# Patient Record
Sex: MALE | Race: WHITE | NOT HISPANIC OR LATINO | Employment: FULL TIME | URBAN - NONMETROPOLITAN AREA
[De-identification: names, ages, dates, MRNs, and addresses within clinical notes are randomized per-mention and may not be internally consistent; named-entity substitution may affect disease eponyms.]

---

## 2018-02-16 ENCOUNTER — OFFICE VISIT CONVERTED (OUTPATIENT)
Dept: FAMILY MEDICINE CLINIC | Age: 58
End: 2018-02-16
Attending: NURSE PRACTITIONER

## 2018-05-14 ENCOUNTER — OFFICE VISIT CONVERTED (OUTPATIENT)
Dept: FAMILY MEDICINE CLINIC | Age: 58
End: 2018-05-14
Attending: NURSE PRACTITIONER

## 2018-10-05 ENCOUNTER — OFFICE VISIT CONVERTED (OUTPATIENT)
Dept: FAMILY MEDICINE CLINIC | Age: 58
End: 2018-10-05
Attending: NURSE PRACTITIONER

## 2019-03-30 ENCOUNTER — HOSPITAL ENCOUNTER (OUTPATIENT)
Dept: OTHER | Facility: HOSPITAL | Age: 59
Discharge: HOME | End: 2019-03-30
Attending: NURSE PRACTITIONER

## 2019-03-30 LAB
ALBUMIN SERPL-MCNC: 4.2 G/DL (ref 3.5–5)
ALBUMIN/GLOB SERPL: 1.2 {RATIO} (ref 1.4–2.6)
ALP SERPL-CCNC: 92 U/L (ref 56–119)
ALT SERPL-CCNC: 23 U/L (ref 10–40)
ANION GAP SERPL CALC-SCNC: 15 MMOL/L (ref 8–19)
AST SERPL-CCNC: 19 U/L (ref 15–50)
BILIRUB SERPL-MCNC: 0.18 MG/DL (ref 0.2–1.3)
BUN SERPL-MCNC: 16 MG/DL (ref 5–25)
BUN/CREAT SERPL: 20 {RATIO} (ref 6–20)
CALCIUM SERPL-MCNC: 9.2 MG/DL (ref 8.7–10.4)
CHLORIDE SERPL-SCNC: 99 MMOL/L (ref 99–111)
CHOLEST SERPL-MCNC: 191 MG/DL (ref 107–200)
CHOLEST/HDLC SERPL: 6.6 {RATIO} (ref 3–6)
CONV CO2: 23 MMOL/L (ref 22–32)
CONV TOTAL PROTEIN: 7.6 G/DL (ref 6.3–8.2)
CREAT UR-MCNC: 0.82 MG/DL (ref 0.7–1.2)
GFR SERPLBLD BASED ON 1.73 SQ M-ARVRAT: >60 ML/MIN/{1.73_M2}
GLOBULIN UR ELPH-MCNC: 3.4 G/DL (ref 2–3.5)
GLUCOSE SERPL-MCNC: 94 MG/DL (ref 70–99)
HDLC SERPL-MCNC: 29 MG/DL (ref 40–60)
LDLC SERPL CALC-MCNC: 126 MG/DL (ref 70–100)
OSMOLALITY SERPL CALC.SUM OF ELEC: 277 MOSM/KG (ref 273–304)
POTASSIUM SERPL-SCNC: 4.4 MMOL/L (ref 3.5–5.3)
SODIUM SERPL-SCNC: 133 MMOL/L (ref 135–147)
TRIGL SERPL-MCNC: 182 MG/DL (ref 40–150)
VLDLC SERPL-MCNC: 36 MG/DL (ref 5–37)

## 2019-11-05 ENCOUNTER — OFFICE VISIT CONVERTED (OUTPATIENT)
Dept: FAMILY MEDICINE CLINIC | Age: 59
End: 2019-11-05
Attending: NURSE PRACTITIONER

## 2019-11-29 ENCOUNTER — HOSPITAL ENCOUNTER (OUTPATIENT)
Dept: OTHER | Facility: HOSPITAL | Age: 59
Discharge: HOME | End: 2019-11-29
Attending: NURSE PRACTITIONER

## 2019-11-29 LAB
ALBUMIN SERPL-MCNC: 4.1 G/DL (ref 3.5–5)
ALBUMIN/GLOB SERPL: 1.2 {RATIO} (ref 1.4–2.6)
ALP SERPL-CCNC: 95 U/L (ref 56–119)
ALT SERPL-CCNC: 18 U/L (ref 10–40)
ANION GAP SERPL CALC-SCNC: 18 MMOL/L (ref 8–19)
AST SERPL-CCNC: 17 U/L (ref 15–50)
BILIRUB SERPL-MCNC: 0.29 MG/DL (ref 0.2–1.3)
BUN SERPL-MCNC: 11 MG/DL (ref 5–25)
BUN/CREAT SERPL: 14 {RATIO} (ref 6–20)
CALCIUM SERPL-MCNC: 9.2 MG/DL (ref 8.7–10.4)
CHLORIDE SERPL-SCNC: 98 MMOL/L (ref 99–111)
CHOLEST SERPL-MCNC: 199 MG/DL (ref 107–200)
CHOLEST/HDLC SERPL: 6.9 {RATIO} (ref 3–6)
CONV CO2: 23 MMOL/L (ref 22–32)
CONV TOTAL PROTEIN: 7.5 G/DL (ref 6.3–8.2)
CREAT UR-MCNC: 0.8 MG/DL (ref 0.7–1.2)
GFR SERPLBLD BASED ON 1.73 SQ M-ARVRAT: >60 ML/MIN/{1.73_M2}
GLOBULIN UR ELPH-MCNC: 3.4 G/DL (ref 2–3.5)
GLUCOSE SERPL-MCNC: 91 MG/DL (ref 70–99)
HDLC SERPL-MCNC: 29 MG/DL (ref 40–60)
LDLC SERPL CALC-MCNC: 123 MG/DL (ref 70–100)
OSMOLALITY SERPL CALC.SUM OF ELEC: 279 MOSM/KG (ref 273–304)
POTASSIUM SERPL-SCNC: 4.4 MMOL/L (ref 3.5–5.3)
SODIUM SERPL-SCNC: 135 MMOL/L (ref 135–147)
TRIGL SERPL-MCNC: 233 MG/DL (ref 40–150)
VLDLC SERPL-MCNC: 47 MG/DL (ref 5–37)

## 2020-01-01 ENCOUNTER — HOSPITAL ENCOUNTER (OUTPATIENT)
Dept: OTHER | Facility: HOSPITAL | Age: 60
Discharge: HOME | End: 2020-09-04
Attending: NURSE PRACTITIONER

## 2020-01-01 ENCOUNTER — OFFICE VISIT CONVERTED (OUTPATIENT)
Dept: FAMILY MEDICINE CLINIC | Age: 60
End: 2020-01-01
Attending: NURSE PRACTITIONER

## 2020-01-01 LAB
ALBUMIN SERPL-MCNC: 4.1 G/DL (ref 3.5–5)
ALBUMIN/GLOB SERPL: 1.3 {RATIO} (ref 1.4–2.6)
ALP SERPL-CCNC: 78 U/L (ref 56–119)
ALT SERPL-CCNC: 16 U/L (ref 10–40)
ANION GAP SERPL CALC-SCNC: 15 MMOL/L (ref 8–19)
AST SERPL-CCNC: 17 U/L (ref 15–50)
BILIRUB SERPL-MCNC: 0.22 MG/DL (ref 0.2–1.3)
BUN SERPL-MCNC: 11 MG/DL (ref 5–25)
BUN/CREAT SERPL: 14 {RATIO} (ref 6–20)
CALCIUM SERPL-MCNC: 9.3 MG/DL (ref 8.7–10.4)
CHLORIDE SERPL-SCNC: 102 MMOL/L (ref 99–111)
CHOLEST SERPL-MCNC: 213 MG/DL (ref 107–200)
CHOLEST/HDLC SERPL: 7.3 {RATIO} (ref 3–6)
CONV CO2: 22 MMOL/L (ref 22–32)
CONV TOTAL PROTEIN: 7.2 G/DL (ref 6.3–8.2)
CREAT UR-MCNC: 0.78 MG/DL (ref 0.7–1.2)
GFR SERPLBLD BASED ON 1.73 SQ M-ARVRAT: >60 ML/MIN/{1.73_M2}
GLOBULIN UR ELPH-MCNC: 3.1 G/DL (ref 2–3.5)
GLUCOSE SERPL-MCNC: 90 MG/DL (ref 70–99)
HDLC SERPL-MCNC: 29 MG/DL (ref 40–60)
LDLC SERPL CALC-MCNC: 143 MG/DL (ref 70–100)
OSMOLALITY SERPL CALC.SUM OF ELEC: 279 MOSM/KG (ref 273–304)
POTASSIUM SERPL-SCNC: 4 MMOL/L (ref 3.5–5.3)
SODIUM SERPL-SCNC: 135 MMOL/L (ref 135–147)
TRIGL SERPL-MCNC: 204 MG/DL (ref 40–150)
VLDLC SERPL-MCNC: 41 MG/DL (ref 5–37)

## 2020-05-20 ENCOUNTER — OFFICE VISIT CONVERTED (OUTPATIENT)
Dept: FAMILY MEDICINE CLINIC | Age: 60
End: 2020-05-20
Attending: NURSE PRACTITIONER

## 2021-01-01 ENCOUNTER — TELEPHONE (OUTPATIENT)
Dept: ORTHOPEDIC SURGERY | Facility: CLINIC | Age: 61
End: 2021-01-01

## 2021-01-01 ENCOUNTER — OFFICE VISIT CONVERTED (OUTPATIENT)
Dept: FAMILY MEDICINE CLINIC | Age: 61
End: 2021-01-01
Attending: NURSE PRACTITIONER

## 2021-01-01 ENCOUNTER — OFFICE VISIT (OUTPATIENT)
Dept: ORTHOPEDIC SURGERY | Facility: CLINIC | Age: 61
End: 2021-01-01

## 2021-01-01 ENCOUNTER — OFFICE VISIT CONVERTED (OUTPATIENT)
Dept: FAMILY MEDICINE CLINIC | Age: 61
End: 2021-01-01
Attending: FAMILY MEDICINE

## 2021-01-01 ENCOUNTER — HOSPITAL ENCOUNTER (OUTPATIENT)
Dept: OTHER | Facility: HOSPITAL | Age: 61
Discharge: HOME | End: 2021-04-08
Attending: FAMILY MEDICINE

## 2021-01-01 ENCOUNTER — HOSPITAL ENCOUNTER (OUTPATIENT)
Dept: OTHER | Facility: HOSPITAL | Age: 61
Discharge: HOME | End: 2021-02-04
Attending: FAMILY MEDICINE

## 2021-01-01 VITALS — HEIGHT: 75 IN | TEMPERATURE: 97.2 F | WEIGHT: 229 LBS | BODY MASS INDEX: 28.47 KG/M2

## 2021-01-01 VITALS
BODY MASS INDEX: 30.54 KG/M2 | TEMPERATURE: 97.6 F | HEIGHT: 73 IN | WEIGHT: 230.4 LBS | SYSTOLIC BLOOD PRESSURE: 133 MMHG | HEART RATE: 86 BPM | DIASTOLIC BLOOD PRESSURE: 78 MMHG

## 2021-01-01 VITALS
SYSTOLIC BLOOD PRESSURE: 126 MMHG | HEART RATE: 123 BPM | TEMPERATURE: 98 F | BODY MASS INDEX: 32.2 KG/M2 | WEIGHT: 243 LBS | DIASTOLIC BLOOD PRESSURE: 89 MMHG | SYSTOLIC BLOOD PRESSURE: 139 MMHG | HEART RATE: 122 BPM | HEIGHT: 73 IN | HEIGHT: 73 IN | DIASTOLIC BLOOD PRESSURE: 93 MMHG | BODY MASS INDEX: 32.06 KG/M2

## 2021-01-01 VITALS
WEIGHT: 232.6 LBS | HEART RATE: 82 BPM | DIASTOLIC BLOOD PRESSURE: 90 MMHG | TEMPERATURE: 96.9 F | BODY MASS INDEX: 30.83 KG/M2 | SYSTOLIC BLOOD PRESSURE: 136 MMHG | HEIGHT: 73 IN

## 2021-01-01 VITALS
WEIGHT: 244.5 LBS | DIASTOLIC BLOOD PRESSURE: 93 MMHG | OXYGEN SATURATION: 95 % | TEMPERATURE: 97 F | BODY MASS INDEX: 32.4 KG/M2 | HEIGHT: 73 IN | HEART RATE: 108 BPM | SYSTOLIC BLOOD PRESSURE: 133 MMHG

## 2021-01-01 VITALS
DIASTOLIC BLOOD PRESSURE: 80 MMHG | TEMPERATURE: 98.1 F | HEIGHT: 73 IN | SYSTOLIC BLOOD PRESSURE: 127 MMHG | WEIGHT: 232.4 LBS | HEART RATE: 89 BPM | BODY MASS INDEX: 30.8 KG/M2

## 2021-01-01 VITALS
HEIGHT: 73 IN | TEMPERATURE: 97.1 F | HEART RATE: 88 BPM | BODY MASS INDEX: 30.77 KG/M2 | SYSTOLIC BLOOD PRESSURE: 138 MMHG | DIASTOLIC BLOOD PRESSURE: 85 MMHG | WEIGHT: 232.2 LBS

## 2021-01-01 VITALS
TEMPERATURE: 97.1 F | WEIGHT: 235 LBS | DIASTOLIC BLOOD PRESSURE: 83 MMHG | HEIGHT: 76 IN | BODY MASS INDEX: 28.62 KG/M2 | SYSTOLIC BLOOD PRESSURE: 127 MMHG | HEART RATE: 87 BPM

## 2021-01-01 VITALS
TEMPERATURE: 98 F | BODY MASS INDEX: 30.85 KG/M2 | DIASTOLIC BLOOD PRESSURE: 77 MMHG | HEIGHT: 73 IN | SYSTOLIC BLOOD PRESSURE: 135 MMHG | HEART RATE: 97 BPM | WEIGHT: 232.8 LBS

## 2021-01-01 VITALS
WEIGHT: 249.8 LBS | TEMPERATURE: 97.3 F | HEIGHT: 73 IN | HEART RATE: 108 BPM | SYSTOLIC BLOOD PRESSURE: 126 MMHG | DIASTOLIC BLOOD PRESSURE: 85 MMHG | BODY MASS INDEX: 33.11 KG/M2

## 2021-01-01 DIAGNOSIS — S42.295A OTHER CLOSED NONDISPLACED FRACTURE OF PROXIMAL END OF LEFT HUMERUS, INITIAL ENCOUNTER: Primary | ICD-10-CM

## 2021-01-01 DIAGNOSIS — S42.295D OTHER CLOSED NONDISPLACED FRACTURE OF PROXIMAL END OF LEFT HUMERUS WITH ROUTINE HEALING, SUBSEQUENT ENCOUNTER: Primary | ICD-10-CM

## 2021-01-01 LAB
ALBUMIN SERPL-MCNC: 4.3 G/DL (ref 3.5–5)
ALBUMIN SERPL-MCNC: 4.4 G/DL (ref 3.5–5)
ALBUMIN/GLOB SERPL: 1.1 {RATIO} (ref 1.4–2.6)
ALBUMIN/GLOB SERPL: 1.2 {RATIO} (ref 1.4–2.6)
ALP SERPL-CCNC: 232 U/L (ref 56–119)
ALP SERPL-CCNC: 245 U/L (ref 56–119)
ALT SERPL-CCNC: 19 U/L (ref 10–40)
ALT SERPL-CCNC: 22 U/L (ref 10–40)
ANION GAP SERPL CALC-SCNC: 19 MMOL/L (ref 8–19)
ANION GAP SERPL CALC-SCNC: 20 MMOL/L (ref 8–19)
APPEARANCE UR: CLEAR
AST SERPL-CCNC: 24 U/L (ref 15–50)
AST SERPL-CCNC: 28 U/L (ref 15–50)
BACTERIA UR QL AUTO: ABNORMAL
BASOPHILS # BLD AUTO: 0.06 10*3/UL (ref 0–0.2)
BASOPHILS # BLD MANUAL: 0.03 10*3/UL (ref 0–0.2)
BASOPHILS NFR BLD AUTO: 0.3 % (ref 0–3)
BASOPHILS NFR BLD MANUAL: 0.2 % (ref 0–3)
BILIRUB SERPL-MCNC: 0.36 MG/DL (ref 0.2–1.3)
BILIRUB SERPL-MCNC: 0.4 MG/DL (ref 0.2–1.3)
BILIRUB UR QL: NEGATIVE
BUN SERPL-MCNC: 10 MG/DL (ref 5–25)
BUN SERPL-MCNC: 10 MG/DL (ref 5–25)
BUN/CREAT SERPL: 14 {RATIO} (ref 6–20)
BUN/CREAT SERPL: 15 {RATIO} (ref 6–20)
CALCIUM SERPL-MCNC: 9.5 MG/DL (ref 8.7–10.4)
CALCIUM SERPL-MCNC: 9.5 MG/DL (ref 8.7–10.4)
CASTS URNS QL MICRO: ABNORMAL /[LPF]
CHLORIDE SERPL-SCNC: 94 MMOL/L (ref 99–111)
CHLORIDE SERPL-SCNC: 96 MMOL/L (ref 99–111)
CHOLEST SERPL-MCNC: 179 MG/DL (ref 107–200)
CHOLEST/HDLC SERPL: 5.6 {RATIO} (ref 3–6)
CK SERPL-CCNC: 124 U/L (ref 57–374)
COLOR UR: YELLOW
CONV ABS IMM GRAN: 0.12 10*3/UL (ref 0–0.2)
CONV CO2: 20 MMOL/L (ref 22–32)
CONV CO2: 21 MMOL/L (ref 22–32)
CONV IMMATURE GRAN: 0.6 % (ref 0–1.8)
CONV LEUKOCYTE ESTERASE: NEGATIVE
CONV TOTAL PROTEIN: 8.1 G/DL (ref 6.3–8.2)
CONV TOTAL PROTEIN: 8.1 G/DL (ref 6.3–8.2)
CONV UROBILINOGEN IN URINE BY AUTOMATED TEST STRIP: 0.2 {EHRLICHU}/DL (ref 0.1–1)
CREAT UR-MCNC: 0.67 MG/DL (ref 0.7–1.2)
CREAT UR-MCNC: 0.72 MG/DL (ref 0.7–1.2)
DEPRECATED RDW RBC AUTO: 42.9 FL (ref 35.1–43.9)
DEPRECATED RDW RBC AUTO: 44.7 FL
EOSINOPHIL # BLD AUTO: 0.04 10*3/UL (ref 0–0.7)
EOSINOPHIL # BLD AUTO: 0.2 % (ref 0–7)
EOSINOPHIL # BLD MANUAL: 0.04 10*3/UL (ref 0–0.7)
EOSINOPHIL NFR BLD MANUAL: 0.3 % (ref 0–7)
EPI CELLS #/AREA URNS HPF: ABNORMAL /[HPF]
ERYTHROCYTE [DISTWIDTH] IN BLOOD BY AUTOMATED COUNT: 13 % (ref 11.6–14.4)
ERYTHROCYTE [DISTWIDTH] IN BLOOD BY AUTOMATED COUNT: 13.1 % (ref 11.5–14.5)
ERYTHROCYTE [SEDIMENTATION RATE] IN BLOOD: 55 MM/H (ref 0–20)
ERYTHROCYTE [SEDIMENTATION RATE] IN BLOOD: 65 MM/H (ref 0–20)
GFR SERPLBLD BASED ON 1.73 SQ M-ARVRAT: >60 ML/MIN/{1.73_M2}
GFR SERPLBLD BASED ON 1.73 SQ M-ARVRAT: >60 ML/MIN/{1.73_M2}
GLOBULIN UR ELPH-MCNC: 3.7 G/DL (ref 2–3.5)
GLOBULIN UR ELPH-MCNC: 3.8 G/DL (ref 2–3.5)
GLUCOSE 24H UR-MCNC: NEGATIVE MG/DL
GLUCOSE SERPL-MCNC: 102 MG/DL (ref 70–99)
GLUCOSE SERPL-MCNC: 97 MG/DL (ref 70–99)
GRANS (ABSOLUTE): 10.19 10*3/UL (ref 2–8)
GRANS: 70.3 % (ref 30–85)
HBA1C MFR BLD: 14.2 G/DL (ref 14–18)
HCT VFR BLD AUTO: 42.2 % (ref 42–52)
HCT VFR BLD AUTO: 42.8 % (ref 42–52)
HDLC SERPL-MCNC: 32 MG/DL (ref 40–60)
HGB BLD-MCNC: 14.1 G/DL (ref 14–18)
HGB UR QL STRIP: NEGATIVE
IMM GRANULOCYTES # BLD: 0.05 10*3/UL (ref 0–0.54)
IMM GRANULOCYTES NFR BLD: 0.3 % (ref 0–0.43)
KETONES UR QL STRIP: 15 MG/DL
LDLC SERPL CALC-MCNC: 114 MG/DL (ref 70–100)
LYMPHOCYTES # BLD AUTO: 2.96 10*3/UL (ref 1–5)
LYMPHOCYTES # BLD MANUAL: 2.91 10*3/UL (ref 1–5)
LYMPHOCYTES NFR BLD AUTO: 15.3 % (ref 20–45)
LYMPHOCYTES NFR BLD MANUAL: 8.8 % (ref 3–10)
MCH RBC QN AUTO: 30 PG (ref 27–31)
MCH RBC QN AUTO: 30.5 PG (ref 27–31)
MCHC RBC AUTO-ENTMCNC: 33.2 G/DL (ref 33–37)
MCHC RBC AUTO-ENTMCNC: 33.4 G/DL (ref 33–37)
MCV RBC AUTO: 89.8 FL (ref 80–96)
MCV RBC AUTO: 91.8 FL (ref 80–96)
MONOCYTES # BLD AUTO: 1.27 10*3/UL (ref 0.2–1.2)
MONOCYTES # BLD AUTO: 1.57 10*3/UL (ref 0.2–1.2)
MONOCYTES NFR BLD AUTO: 8.1 % (ref 3–10)
MUCOUS THREADS URNS QL MICRO: ABNORMAL
NEUTROPHILS # BLD AUTO: 14.57 10*3/UL (ref 2–8)
NEUTROPHILS NFR BLD AUTO: 75.5 % (ref 30–85)
NITRITE UR-MCNC: NEGATIVE MG/ML
NRBC CBCN: 0 % (ref 0–0.7)
OSMOLALITY SERPL CALC.SUM OF ELEC: 267 MOSM/KG (ref 273–304)
OSMOLALITY SERPL CALC.SUM OF ELEC: 273 MOSM/KG (ref 273–304)
PH UR STRIP.AUTO: 7 [PH] (ref 5–8)
PLATELET # BLD AUTO: 454 10*3/UL (ref 130–400)
PLATELET # BLD AUTO: 457 10*3/UL (ref 130–400)
PMV BLD AUTO: 8.5 FL (ref 7.4–10.4)
PMV BLD AUTO: 8.7 FL (ref 9.4–12.4)
POTASSIUM SERPL-SCNC: 4.4 MMOL/L (ref 3.5–5.3)
POTASSIUM SERPL-SCNC: 4.5 MMOL/L (ref 3.5–5.3)
PROT UR-MCNC: NEGATIVE MG/DL
PSA SERPL-MCNC: 1.83 NG/ML (ref 0–4)
RBC # BLD AUTO: 4.66 10*6/UL (ref 4.7–6.1)
RBC # BLD AUTO: 4.7 10*6/UL (ref 4.7–6.1)
RBC # BLD AUTO: ABNORMAL /[HPF]
SARS-COV-2 RNA SPEC QL NAA+PROBE: NOT DETECTED
SODIUM SERPL-SCNC: 129 MMOL/L (ref 135–147)
SODIUM SERPL-SCNC: 132 MMOL/L (ref 135–147)
SP GR UR STRIP: 1.02 (ref 1–1.03)
SPECIMEN SOURCE: ABNORMAL
TRIGL SERPL-MCNC: 167 MG/DL (ref 40–150)
TSH SERPL-ACNC: 1.45 M[IU]/L (ref 0.27–4.2)
UNIDENT CRYS URNS QL MICRO: ABNORMAL /[HPF]
VARIANT LYMPHS NFR BLD MANUAL: 20.1 % (ref 20–45)
VLDLC SERPL-MCNC: 33 MG/DL (ref 5–37)
WBC # BLD AUTO: 14.49 10*3/UL (ref 4.8–10.8)
WBC # BLD AUTO: 19.32 10*3/UL (ref 4.8–10.8)
WBC #/AREA URNS HPF: ABNORMAL /[HPF]

## 2021-01-01 PROCEDURE — 99204 OFFICE O/P NEW MOD 45 MIN: CPT | Performed by: PHYSICIAN ASSISTANT

## 2021-01-01 PROCEDURE — 23600 CLTX PROX HUMRL FX W/O MNPJ: CPT | Performed by: PHYSICIAN ASSISTANT

## 2021-01-01 RX ORDER — ASPIRIN 81 MG/1
81 TABLET ORAL DAILY
COMMUNITY

## 2021-01-01 RX ORDER — CETIRIZINE HYDROCHLORIDE 10 MG/1
10 TABLET ORAL
COMMUNITY
Start: 2021-01-01

## 2021-05-18 NOTE — PROGRESS NOTES
Satnam Leone 1960     Office/Outpatient Visit    Visit Date: Tue, Nov 5, 2019 02:08 pm    Provider: Tiff Milner N.P. (Assistant: Sarah Ruiz MA)    Location: [facility]        Electronically signed by Tiff Milner N.P. on  11/08/2019 08:25:19 PM                             SUBJECTIVE:        CC:     Mr. Leone is a 59 year old White male.  He presents with medication refills.          HPI:         PHQ-9 Depression Screening: Completed form scanned and in chart; Total Score 0         With regard to the hyperlipidemia, patient presents with hyperlipidemia.  Current treatment includes Pravachol and a low cholesterol/low fat diet.  Compliance with treatment has been good.  He denies experiencing any hypercholesterolemia related symptoms.          Additionally, he presents with history of hypertension.  hypertension details; his current cardiac medication regimen includes an ACE inhibitor ( lisinopril ).  He did not bring his blood pressure diary, but says that pressures have been well controlled.  He is tolerating the medication well without side effects.  Compliance with treatment has been good; he takes his medication as directed.      ROS:     CONSTITUTIONAL:  Negative for chills, fatigue, fever, and weight change.      CARDIOVASCULAR:  Negative for chest pain, palpitations, tachycardia, orthopnea, and edema.      RESPIRATORY:  Negative for cough, dyspnea, and hemoptysis.      GASTROINTESTINAL:  Negative for abdominal pain, heartburn, constipation, diarrhea, and stool changes.      INTEGUMENTARY:  Positive for lipomas upper back.      NEUROLOGICAL:  Negative for dizziness, headaches, paresthesias, and weakness.      ENDOCRINE:  Negative for hair loss, heat/cold intolerance, polydipsia, and polyphagia.      PSYCHIATRIC:  Negative for anxiety, depression, and sleep disturbances.          PMH/FMH/SH:     Last Reviewed on 11/05/2019 02:24 PM by Tiff Milner    Past Medical  History:             PREVENTIVE HEALTH MAINTENANCE             COLORECTAL CANCER SCREENING: declines colorectal cancer screening, understands reason for testing     PSA: declines.  understands reason for test         Surgical History:         Positive for    Splenectomy and    spinal fusion mid 1980s;;         Family History:         Positive for Hypertension ( father ).  Father:  at age 58;  Hypertension     Mother: Healthy         Social History:     Occupation: [employer]     Marital Status:      Children: None         Tobacco/Alcohol/Supplements:     Last Reviewed on 2019 02:10 PM by Sarah Ruiz    Tobacco: Current Smoker: He currently smokes every day, 1/2 pack per day.              Current Problems:     Last Reviewed on 2018 12:47 PM by Caitlin Lee    Hyperlipidemia     Hypertension     Atypical skin lesion     History of splenectomy         Immunizations:     Pneumococcal, 23-valent IM/SC (adult and pt >=2yr) 2006         Allergies:     Last Reviewed on 10/05/2018 11:20 AM by Spurling, Sarah C      No Known Drug Allergies.         Current Medications:     Last Reviewed on 10/05/2018 11:21 AM by Spurling, Sarah C    Lisinopril 20mg Tablet 1 tab daily     Pravastatin 20mg Tablet 1 tab q day hs         OBJECTIVE:        Vitals:         Historical:     10/05/2018  BP:   127/80 mm Hg ( (left arm, , sitting, );)     10/05/2018  Wt:   232.4lbs        Current: 2019 2:13:45 PM    Ht:  6 ft, 1 in;  Wt: 232.8 lbs;  BMI: 30.7    T: 98 F (oral);  BP: 135/77 mm Hg (left arm, sitting);  P: 97 bpm (left arm (BP Cuff), sitting);  sCr: 0.82 mg/dL;  GFR: 109.38        Exams:     PHYSICAL EXAM:     GENERAL:  well developed and nourished; appropriately groomed; in no apparent distress;     NECK: thyroid is non-palpable;     RESPIRATORY: normal respiratory rate and pattern with no distress; normal breath sounds with no rales, rhonchi, wheezes or rubs;     CARDIOVASCULAR:  normal rate; rhythm is regular;     SKIN: lipoma ( 1 cm at 2 locations upper back and 3 cm upper back );     MUSCULOSKELETAL:  Normal range of motion, strength and tone;     NEUROLOGIC: mental status: alert and oriented x 3; GROSSLY INTACT     PSYCHIATRIC:  appropriate affect and demeanor; normal speech pattern; grossly normal memory;         ASSESSMENT           V79.0   Z13.31  Screening for depression              DDx:     272.4   I10  Hyperlipidemia              DDx:     401.1   I10  Hypertension              DDx:     214.8   D17.79  Lipoma, other specified site              DDx:         ORDERS:         Meds Prescribed:       Refill of: Pravastatin 20mg Tablet 1 tab q day hs  #90 (Ninety) tablet(s) Refills: 1       Refill of: Lisinopril 20mg Tablet 1 tab daily  #90 (Ninety) tablet(s) Refills: 1         Lab Orders:       [id number]  HTN - [facility] CMP AND LIPID: [id number]  (Send-Out)           Other Orders:         Depression screen negative  (In-House)                   PLAN:          Screening for depression     MIPS PHQ-9 Depression Screening: Completed form scanned and in chart; Total Score 0; Negative Depression Screen   Smoking cessation encouraged. Counseling for less than 3 minutes.            Orders:         Depression screen negative  (In-House)            Hyperlipidemia declines PSA, colonoscopy, tetanus vaccine or flu vaccine         RECOMMENDATIONS given include: smoking cessation and low cholesterol/low fat diet.            Prescriptions:       Refill of: Pravastatin 20mg Tablet 1 tab q day hs  #90 (Ninety) tablet(s) Refills: 1          Hypertension     LABORATORY:  Labs ordered to be performed today include HTN/Lipid Panel: CMP, Lipid.      RECOMMENDATIONS given include: decrease consumption of alcohol, perform routine monitoring of blood pressure with home blood pressure cuff, reduction of dietary salt intake, take medication as prescribed, try not to miss doses, and smoking cessation.             Prescriptions:       Refill of: Lisinopril 20mg Tablet 1 tab daily  #90 (Ninety) tablet(s) Refills: 1           Orders:       [id number]  HTN - [facility] CMP AND LIPID: [id number], [id number]  (Send-Out)            Lipoma, other specified site         RECOMMENDATIONS given include: advise referral to surgeon .  declines..              Patient Recommendations:        For  Hyperlipidemia:     Stop smoking! Reduce the amount of cholesterol and saturated fat in your diet.          For  Hypertension:     Avoid alcohol as it can contribute to elevated blood pressure. Begin monitoring your blood pressure by brief nurse visits at our office, a home blood pressure monitor, or by checking on the machines in pharmacies or stores.  Keep a log of the readings. Reduce the amount of salt in your diet. Stop smoking!              CHARGE CAPTURE           **Please note: ICD descriptions below are intended for billing purposes only and may not represent clinical diagnoses**        Primary Diagnosis:         V79.0 Screening for depression            Z13.31    Encounter for screening for depression              Orders:          [id number]   Office/outpatient visit; established patient, level 3  (In-House)                Depression screen negative  (In-House)           272.4 Hyperlipidemia            I10    Essential (primary) hypertension    401.1 Hypertension            I10    Essential (primary) hypertension    214.8 Lipoma, other specified site            D17.79    Benign lipomatous neoplasm of other sites

## 2021-05-18 NOTE — PROGRESS NOTES
"Satnam Leone  1960     Office/Outpatient Visit    Visit Date: Wed, Mar 31, 2021 09:50 am    Provider: Magdalena Hyman N.P. (Assistant: Shannen Garcia MA)    Location: [facility]        Electronically signed by Magdalena Hyman N.P. on  2021 11:45:47 AM                             Subjective:        CC: Mr. Leone is a 60 year old White male.  Low back pain, leg pain, hip pain. Covid vaccine 3/24/21 \"states pain started after the vaccine\"         HPI:           Patient to be evaluated for pain in unspecified joint.  The patient notes diffuse joint pain.  This has been a problem for the past >1 weeks.  Primary joints affected include right shoulder , low back and hips and legs.  He denies associated symptoms including redness or swelling.  Heat has helped.  Tylenol did not help. and tried icy hot . He got first moderna vaccine last wednesday    ROS:     CONSTITUTIONAL:  Negative for fever.      CARDIOVASCULAR:  Negative for chest pain, palpitations, tachycardia, orthopnea, and edema.      RESPIRATORY:  Negative for recent cough and dyspnea.      GASTROINTESTINAL:  Negative for abdominal pain, heartburn, constipation, diarrhea, and stool changes.      GENITOURINARY:  Negative for dysuria and hematuria.      NEUROLOGICAL:  Negative for paresthesias.          Past Medical History / Family History / Social History:         Last Reviewed on 3/31/2021 10:11 AM by Magdalena Hyman    Past Medical History:             PREVENTIVE HEALTH MAINTENANCE             COLORECTAL CANCER SCREENING: declines colorectal cancer screening, understands reason for testing     PSA: declines.  understands reason for test         Surgical History:         Positive for    Splenectomy and    spinal fusion mid 1980s;;         Family History:         Positive for Hypertension ( father ).  Father:  at age 58;  Hypertension     Mother: Healthy         Social History:     Occupation: [employer]     " Marital Status:      Children: None         Tobacco/Alcohol/Supplements:     Last Reviewed on 3/31/2021 09:55 AM by Shannen Garcia    Tobacco: Current Smoker: He currently smokes every day, 1/2 pack per day.          Substance Abuse History:     Last Reviewed on 2/12/2021 10:35 AM by Joanna Contreras        Mental Health History:     Last Reviewed on 2/12/2021 10:35 AM by Joanna Contreras        Communicable Diseases (eg STDs):     Last Reviewed on 2/12/2021 10:35 AM by Joanna Contreras        Immunizations:     influenza, injectable, quadrivalent, preservative free (FLUZONE QUAD [id number]) 10/3/2020    Pneumococcal, 23-valent IM/SC (adult and pt >=2yr) 11/20/2006    SARS-COV-2 (COVID-19) vaccine, UNSPECIFIED 3/24/2021        Allergies:     Last Reviewed on 3/31/2021 09:55 AM by Shannen Garcia    No Known Allergies.        Current Medications:     Last Reviewed on 3/31/2021 09:55 AM by Shannen Garcia    pravastatin 20 mg oral tablet [TAKE 1 TABLET BY MOUTH EVERY NIGHT AT BEDTIME]    lisinopriL 20 mg oral tablet [take 1 tablet (20 mg) by oral route twice daily]        Objective:        Vitals:         Current: 3/31/2021 9:56:49 AM    Ht:  6 ft, 1 in;  Wt: 249.8 lbs;  BMI: 33.0T: 97.3 F (temporal);  BP: 126/85 mm Hg (left arm, sitting);  P: 108 bpm (left arm (BP Cuff), sitting);  sCr: 0.78 mg/dL;  GFR: 117.08        Exams:     PHYSICAL EXAM:     GENERAL: Vitals recorded well developed, well nourished;  well groomed;  no apparent distress, seems to be in pain;     RESPIRATORY: normal respiratory rate and pattern with no distress; normal breath sounds with no rales, rhonchi, wheezes or rubs;     CARDIOVASCULAR: normal rate; rhythm is regular;  no systolic murmur; no edema;     MUSCULOSKELETAL: pain with range of motion in: the back;  SLR negative, non tender to palpation of lumbar para spinous muscles;     PSYCHIATRIC: affect/demeanor: anxious;         Assessment:         M25.50   Pain in  unspecified joint       I10   Essential (primary) hypertension           ORDERS:         Meds Prescribed:       [New Rx] cyclobenzaprine 10 mg oral tablet [take 1 tablet (10 mg) by oral route 3 times per day prn muscle pain ], #30 (thirty) tablets, Refills: 1 (one)       [New Rx] etodolac 400 mg oral tablet [take 1 tablet (400 mg) by oral route 2 times per day with food], #60 (sixty) tablets, Refills: 0 (zero)         Lab Orders:       [id number]  BDCBC - [facility] CBC with 3 part diff  (Send-Out)            [id number]  SED - [facility] Sedimentation rate, non-automated ESR  (Send-Out)            74192  COMP - [facility] Comp. Metabolic Panel  (Send-Out)                      Plan:         Pain in unspecified jointcontinue icy hot, try a lidocaine patch, sent over for labs/he has worked this week, but is off rest of the week, rest    LABORATORY:  Labs ordered to be performed today include CBC and ESR.      FOLLOW-UP: pending labs           Prescriptions:       [New Rx] cyclobenzaprine 10 mg oral tablet [take 1 tablet (10 mg) by oral route 3 times per day prn muscle pain ], #30 (thirty) tablets, Refills: 1 (one)       [New Rx] etodolac 400 mg oral tablet [take 1 tablet (400 mg) by oral route 2 times per day with food], #60 (sixty) tablets, Refills: 0 (zero)           Orders:       [id number]  BDCBC - [facility] CBC with 3 part diff  (Send-Out)            93196  SED - [facility] Sedimentation rate, non-automated ESR  (Send-Out)              Essential (primary) hypertension    LABORATORY:  Labs ordered to be performed today include Comprehensive metabolic panel.  MIPS Smoking cessation encouraged. Counseling for less than 3 minutes.            Orders:       [id number]  COMP - [facility] Comp. Metabolic Panel  (Send-Out)                  Charge Capture:         Primary Diagnosis:     M25.50  Pain in unspecified joint           Orders:      [id number]  Office/outpatient visit; established patient, level 4  (In-House)              I10  Essential (primary) hypertension

## 2021-05-18 NOTE — PROGRESS NOTES
Satnam Leone  1960     Office/Outpatient Visit    Visit Date:  10:16 am    Provider: Benedicto Campos MD (Assistant: Esme Steel, )    Location: [facility]        Electronically signed by Benedicto Campos MD on  04/10/2021 05:28:56 PM                             Subjective:        CC: low back pain    HPI:       Adebayo is in today for low back pain.  Duration - 2 weeks.  Getting progressively worse.  Seen last week - given muscle relaxer and NSAID.  Short term benefit - now worse.  Pain is in the middle of the lower back.  No radiology yet.  No recent injury.  Radiation - maybe in the L leg sometimes.  Severity - 9/10.  Aggravating factors - movements.  Alleviating factors - sitting on heat pad.    ROS:     CONSTITUTIONAL:  Negative for chills and fever.      CARDIOVASCULAR:  Negative for chest pain and palpitations.      RESPIRATORY:  Negative for recent cough and dyspnea.      GASTROINTESTINAL:  Negative for abdominal pain, nausea and vomiting.      INTEGUMENTARY:  Negative for atypical mole(s) and rash.          Past Medical History / Family History / Social History:         Last Reviewed on 3/31/2021 10:11 AM by Magdalena Hyman    Past Medical History:             PREVENTIVE HEALTH MAINTENANCE             COLORECTAL CANCER SCREENING: declines colorectal cancer screening, understands reason for testing     PSA: declines.  understands reason for test         Surgical History:         Positive for    Splenectomy and    spinal fusion mid ;;         Family History:         Positive for Hypertension ( father ).  Father:  at age 58;  Hypertension     Mother: Healthy         Social History:     Occupation: [employer]     Marital Status:      Children: None         Tobacco/Alcohol/Supplements:     Last Reviewed on 3/31/2021 09:55 AM by Shannen Garcia    Tobacco: Current Smoker: He currently smokes every day, 1/2 pack per day.           Substance Abuse History:     Last Reviewed on 2/12/2021 10:35 AM by Joanna Contreras        Mental Health History:     Last Reviewed on 2/12/2021 10:35 AM by Joanna Contreras        Communicable Diseases (eg STDs):     Last Reviewed on 2/12/2021 10:35 AM by Joanna Contreras        Current Problems:     Last Reviewed on 2/12/2021 10:35 AM by Joanna Contreras    Essential (primary) hypertension    Benign lipomatous neoplasm of other sites    Mixed hyperlipidemia    Cough    Acute upper respiratory infection, unspecified    Pain in unspecified joint        Immunizations:     influenza, injectable, quadrivalent, preservative free (FLUZONE QUAD [id number]) 10/3/2020    SARS-COV-2 (COVID-19) vaccine, UNSPECIFIED 3/24/2021    Pneumococcal, 23-valent IM/SC (adult and pt >=2yr) 11/20/2006        Allergies:     Last Reviewed on 3/31/2021 09:55 AM by Shannen Garcia    No Known Allergies.        Current Medications:     Last Reviewed on 3/31/2021 09:55 AM by Shannen Garcia    pravastatin 20 mg oral tablet [TAKE 1 TABLET BY MOUTH EVERY NIGHT AT BEDTIME]    lisinopriL 20 mg oral tablet [take 1 tablet (20 mg) by oral route twice daily]    cyclobenzaprine 10 mg oral tablet [take 1 tablet (10 mg) by oral route 3 times per day prn muscle pain ]    etodolac 400 mg oral tablet [take 1 tablet (400 mg) by oral route 2 times per day with food]        Objective:        Vitals:         Current: 4/8/2021 10:20:34 AM    Ht:  6 ft, 1 in;  Wt: 243 lbs (Estimated);  BMI: 32.1BP: 126/93 mm Hg (left arm, sitting);  P: 123 bpm (left arm (BP Cuff), sitting);  sCr: 0.78 mg/dL;  GFR: 115.71        Exams:     PHYSICAL EXAM:     GENERAL: Vitals recorded well developed, well nourished;  seems to be in moderate pain;     EYES: extraocular movements intact; conjunctiva and cornea are normal; PERRL;     NECK: range of motion is normal; thyroid is non-palpable;     RESPIRATORY: normal respiratory rate and pattern with no distress; normal  breath sounds with no rales, rhonchi, wheezes or rubs;     CARDIOVASCULAR: rate is tachycardic;  rhythm is regular;  no systolic murmur;     GASTROINTESTINAL: nontender; normal bowel sounds; no masses;     LYMPHATIC: no enlargement of cervical or facial nodes; no supraclavicular nodes;     SKIN:  no significant rashes or lesions; no suspicious moles;     MUSCULOSKELETAL: there is very significant tenderness in the mid lower back - no mass is noted - no other acute finding;     NEUROLOGIC: mental status: alert and oriented x 3; cranial nerves II-XII grossly intact;     PSYCHIATRIC: appropriate affect and demeanor; normal psychomotor function;         Procedures:     Low back pain    1. Toradol 60 mg given IM in the left hip; administered by AS;  lot number [id number]; expires 09/2022 Patient experienced no reaction.              Assessment:         M54.5   Low back pain       I10   Essential (primary) hypertension       E78.2   Mixed hyperlipidemia           ORDERS:         Meds Prescribed:       [New Rx] HYDROcodone-acetaminophen 5-325 mg oral tablet [take 1 tablet by oral route every 4 hours as needed for pain], #18 (eighteen) tablets, Refills: 0 (zero)         Radiology/Test Orders:       [id number]  Radiologic examination, spine, lumbosacral;  minimum of four views  (Send-Out)              Lab Orders:       [id number]  BDCBC - [facility] CBC with 3 part diff  (Send-Out)            [id number]  BDUAM - [facility] Urinalysis, automated, with micro  (Send-Out)            98154  CK - [facility]- CK total  (Send-Out)            [id number]  HTNLP - [facility] CMP AND LIPID: [id number]  (Send-Out)            24087  SED - [facility] Sedimentation rate, non-automated ESR  (Send-Out)              Other Orders:       [id number]  Therapeutic injection  (In-House)              Toradol, per 15 mg  (x4)                  Plan:         Low back pain    LABORATORY:  Labs ordered to be performed today include CBC, ESR, and urinalysis with micro.      RADIOLOGY:  I have ordered  Lumbar/Sacral Spine X-ray to be done today.      RECOMMENDATIONS given include: Adebayo is in pretty severe pain today.  I'm going to recommend additional evaluation as noted.  As much pain as he is in, I may get CT tomorrow.  We will give a few pain medications as noted.  It's not clear to me the source of the pain, but we will get labs and testing and then move forward..  Narcotic or pain medication Toradol 60 mg           Prescriptions:       [New Rx] HYDROcodone-acetaminophen 5-325 mg oral tablet [take 1 tablet by oral route every 4 hours as needed for pain], #18 (eighteen) tablets, Refills: 0 (zero)           Orders:       [id number]  BDCBC - [facility] CBC with 3 part diff  (Send-Out)            [id number]  BDUAM - [facility] Urinalysis, automated, with micro  (Send-Out)            [id number]  Radiologic examination, spine, lumbosacral;  minimum of four views  (Send-Out)            [id number]  SED - [facility] Sedimentation rate, non-automated ESR  (Send-Out)            06036  Therapeutic injection  (In-House)              Toradol, per 15 mg  (x4)          Essential (primary) hypertensionAs above.        Mixed hyperlipidemia    LABORATORY:  Labs ordered to be performed today include CK, total and HTN/Lipid Panel: CMP, Lipid.            Orders:       [id number]  CK - [facility]- CK total  (Send-Out)            [id number]  HTNLP - [facility] CMP AND LIPID: [id number]  (Send-Out)                  Charge Capture:         Primary Diagnosis:     M54.5  Low back pain           Orders:      [id number]  Office/outpatient visit; established patient, level 4  (In-House)            14217  Therapeutic injection  (In-House)              Toradol, per 15 mg  (x4)          I10  Essential (primary) hypertension     E78.2  Mixed hyperlipidemia

## 2021-05-18 NOTE — PROGRESS NOTES
Satnam Leone  1960     Office/Outpatient Visit    Visit Date: Fri, Sep 4, 2020 08:35 am    Provider: Tiff Milner N.P. (Assistant: Alka Hutchins MA)    Location: [facility]        Electronically signed by Tiff Milner N.P. on  09/06/2020 08:38:16 AM                             Subjective:        CC: Mr. Leone is a 60 year old White male.  This is a follow-up visit.          HPI: Seen with [employer] student          Patient to be evaluated for essential (primary) hypertension.  His current cardiac medication regimen includes an ACE inhibitor ( lisinopril 30 mg daily ).  Compliance with treatment has been good; he takes his medication as directed and follows up as directed.  He is tolerating the medication well without side effects.  Mr. Leone does not check his blood pressure other than at his clinic appointments.  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 Leone is now on 3rd shift at [employer], began 2 weeks ago. He is still adjusting and feels his BP may be up due to recent changes.      no changes in benign lipomas of mid upper back          Mixed hyperlipidemia details; current treatment includes Pravachol.  Compliance with treatment has been good; he takes his medication as directed and follows up as directed.  He denies experiencing any hypercholesterolemia related symptoms.      ROS:     CONSTITUTIONAL:  Negative for chills, fatigue, fever, and weight change.      CARDIOVASCULAR:  Negative for chest pain, dizziness, palpitations and pedal edema.      RESPIRATORY:  Negative for recent cough, dyspnea and frequent wheezing.      GASTROINTESTINAL:  Negative for abdominal pain, heartburn, constipation, diarrhea, and stool changes.      MUSCULOSKELETAL:  Negative for arthralgias and joint stiffness.      INTEGUMENTARY:  Positive for lipomas on upper back.      NEUROLOGICAL:  Negative for dizziness, fainting, headaches and weakness.      PSYCHIATRIC:   Negative for anxiety, depression and sleep disturbance.          Past Medical History / Family History / Social History:         Last Reviewed on 2020 01:46 PM by Tiff Milner    Past Medical History:             PREVENTIVE HEALTH MAINTENANCE             COLORECTAL CANCER SCREENING: declines colorectal cancer screening, understands reason for testing     PSA: declines.  understands reason for test         Surgical History:         Positive for    Splenectomy and    spinal fusion mid 1980s;;         Family History:         Positive for Hypertension ( father ).  Father:  at age 58;  Hypertension     Mother: Healthy         Social History:     Occupation: [employer]     Marital Status:      Children: None         Tobacco/Alcohol/Supplements:     Last Reviewed on 2020 01:39 PM by Spurling, Sarah C    Tobacco: Current Smoker: He currently smokes every day, 1/2 pack per day.          Current Problems:     Last Reviewed on 2020 01:39 PM by Spurling, Sarah C    Essential (primary) hypertension    Benign lipomatous neoplasm of other sites    Bitten or stung by nonvenomous insect and other nonvenomous arthropods, initial encounter        Immunizations:     Pneumococcal, 23-valent IM/SC (adult and pt >=2yr) 2006        Allergies:     Last Reviewed on 2020 08:35 AM by Alka Hutchins    No Known Allergies.        Current Medications:     Last Reviewed on 2020 08:35 AM by Alka Hutchins    pravastatin 20 mg oral tablet [TAKE 1 TABLET BY MOUTH EVERY NIGHT AT BEDTIME]    lisinopriL 30 mg oral tablet [take 1 tablet (30 mg) by oral route once daily]        Objective:        Vitals:         Historical:     2020  BP:   138/85 mm Hg ( (left arm, , sitting, );) 2019  BP:   135/77 mm Hg ( (left arm, , sitting, );) 2020  Wt:   232.2lbs    Current: 2020 8:40:14 AM    Ht:  6 ft, 1 in;  Wt: 232.6 lbs;  BMI: 30.7T: 96.9 F (temporal);  BP: 136/90 mm Hg (left arm,  sitting);  P: 82 bpm (left arm (BP Cuff), sitting);  sCr: 0.8 mg/dL;  GFR: 110.74        Exams:     PHYSICAL EXAM:     GENERAL:  well developed and nourished; appropriately groomed; in no apparent distress;     NECK: thyroid is non-palpable;     RESPIRATORY: normal respiratory rate and pattern with no distress; normal breath sounds with no rales, rhonchi, wheezes or rubs;     CARDIOVASCULAR: normal rate; rhythm is regular;  no systolic murmur; no diastolic murmur;    Peripheral Pulses: posterior tibial: 2+ L and 2+ R;  no edema;     LYMPHATIC: no enlargement of cervical or facial nodes;     SKIN: lipoma ( mid upper back );     MUSCULOSKELETAL:  Normal range of motion, strength and tone;     NEUROLOGIC: mental status: alert and oriented x 3;     PSYCHIATRIC: appropriate affect and demeanor;         Assessment:         I10   Essential (primary) hypertension       D17.79   Benign lipomatous neoplasm of other sites       E78.2   Mixed hyperlipidemia           ORDERS:         Meds Prescribed:       [Refilled] lisinopriL 30 mg oral tablet [take 1 tablet (30 mg) by oral route once daily], #90 (ninety) tablets, Refills: 1 (one)       [Refilled] pravastatin 20 mg oral tablet [TAKE 1 TABLET BY MOUTH EVERY NIGHT AT BEDTIME], #90 (ninety) tablets, Refills: 1 (one)         [facility] Orders:       [id number] [id number] [id number]  (Send-Out)                      Plan:         Essential (primary) hypertensionWill check labs today and continue lisinonpril as prescribed. Encouraged to check BP at home,  maintain heart healthy diet, and get intentional exercise. Reminded him to report any dizziness, HA, CP, or SOA    LABORATORY:  Labs ordered to be performed today include HTN/Lipid Panel: CMP, Lipid.  MIPS Smoking cessation encouraged. Counseling for less than 3 minutes.            Prescriptions:       [Refilled] lisinopriL 30 mg oral tablet [take 1 tablet (30 mg) by oral route once daily], #90 (ninety) tablets, Refills: 1  (one)           Orders:       [id number]  HTN - [facility] CMP AND LIPID: [id number], [id number]  (Send-Out)              Benign lipomatous neoplasm of other sitesstable. Patient doesn't want any interventions        Mixed hyperlipidemiaWill check lipid panel today. Continue on current prescriptions and maintain heart healthy diet as well as exercise.         Discussed benefits of PSA, colonoscopy, and low dose CT for lung cancer screening. Patient refused all.            Prescriptions:       [Refilled] pravastatin 20 mg oral tablet [TAKE 1 TABLET BY MOUTH EVERY NIGHT AT BEDTIME], #90 (ninety) tablets, Refills: 1 (one)             Charge Capture:         Primary Diagnosis:     I10  Essential (primary) hypertension           Orders:      [id number]  Office/outpatient visit; established patient, level 4  (In-House)              D17.79  Benign lipomatous neoplasm of other sites     E78.2  Mixed hyperlipidemia

## 2021-05-18 NOTE — PROGRESS NOTES
Satnam Leone  1960     Office/Outpatient Visit    Visit Date:  05:58 am    Provider: Benedicto Campos MD     Location: [facility]        Electronically signed by Benedicto Campos MD on  04/10/2021 05:29:30 PM                             Subjective:        CC: low back pain    HPI:       Adebayo is back this morning for follow up on yesterday's visit.  He had significant tenderness on exam and some abnormal labs.  See lab addendum from yesterday.  He was not able to get x-ray done yesterday and stopped back this morning to have done prior to our visit together.  Adebayo continues to have significant pain.  He was prescribed some hydrocodone last night and says that he did somewhat better.  He was up very early this morning, but that was not because of pain as much as working third shift routinely.  He has not had fever overnight.  He is having some issue with walking because of pain.  He has had some degree of pain in the legs.  He says that he feels 'much better' than he did yesterday.    ROS:     CONSTITUTIONAL:  Negative for chills and fever.      CARDIOVASCULAR:  Negative for chest pain and palpitations.      RESPIRATORY:  Negative for recent cough and dyspnea.      GASTROINTESTINAL:  Negative for abdominal pain, nausea and vomiting.      INTEGUMENTARY:  Negative for atypical mole(s) and rash.          Past Medical History / Family History / Social History:         Last Reviewed on 2021 08:29 AM by Benedicto Campos    Past Medical History:             PREVENTIVE HEALTH MAINTENANCE             COLORECTAL CANCER SCREENING: declines colorectal cancer screening, understands reason for testing     PSA: declines.  understands reason for test         Surgical History:         Positive for    Splenectomy and    spinal fusion mid ;;         Family History:         Positive for Hypertension ( father ).  Father:  at age 58;  Hypertension     Mother: Healthy          Social History:     Occupation: [employer]     Marital Status:      Children: None         Tobacco/Alcohol/Supplements:     Last Reviewed on 4/09/2021 08:29 AM by Benedicto Campos    Tobacco: Current Smoker: He currently smokes every day, 1/2 pack per day.          Substance Abuse History:     Last Reviewed on 4/09/2021 08:29 AM by Benedicto Campos        Mental Health History:     Last Reviewed on 4/09/2021 08:29 AM by Benedicto Campos        Communicable Diseases (eg STDs):     Last Reviewed on 4/09/2021 08:29 AM by Benedicto Campos        Current Problems:     Last Reviewed on 4/09/2021 08:29 AM by Benedicto Campos    Essential (primary) hypertension    Benign lipomatous neoplasm of other sites    Mixed hyperlipidemia    Cough    Acute upper respiratory infection, unspecified    Pain in unspecified joint    Low back pain        Immunizations:     influenza, injectable, quadrivalent, preservative free (FLUZONE QUAD [id number]) 10/3/2020    SARS-COV-2 (COVID-19) vaccine, UNSPECIFIED 3/24/2021    Pneumococcal, 23-valent IM/SC (adult and pt >=2yr) 11/20/2006        Allergies:     Last Reviewed on 4/09/2021 08:29 AM by Benedicto Campos    No Known Allergies.        Current Medications:     Last Reviewed on 4/09/2021 08:29 AM by Benedicto Campos    pravastatin 20 mg oral tablet [TAKE 1 TABLET BY MOUTH EVERY NIGHT AT BEDTIME]    lisinopriL 20 mg oral tablet [take 1 tablet (20 mg) by oral route twice daily]    etodolac 400 mg oral tablet [take 1 tablet (400 mg) by oral route 2 times per day with food]    cyclobenzaprine 10 mg oral tablet [take 1 tablet (10 mg) by oral route 3 times per day prn muscle pain ]    HYDROcodone-acetaminophen 5-325 mg oral tablet [take 1 tablet by oral route every 4 hours as needed for pain]        Objective:        Vitals:         Current: 4/9/2021 8:57:05 AM    Ht:  6 ft, 1 inT: 98 F (temporal);  BP: 140/94 mm Hg (left arm, sitting);  P:  122 bpm (left arm (BP Cuff), sitting);  sCr: 0.67 mg/dL;  GFR: 99.70        Repeat:     9:7:30 AM  BP:   139/89mm Hg (right arm, sitting, p:124)     Exams:     PHYSICAL EXAM:     GENERAL: Vitals recorded well developed,  moderately obese;     EYES: extraocular movements intact; conjunctiva and cornea are normal; PERRL;     NECK: range of motion is normal; thyroid is non-palpable;     RESPIRATORY: normal respiratory rate and pattern with no distress; normal breath sounds with no rales, rhonchi, wheezes or rubs;     CARDIOVASCULAR: rate is tachycardic;  rhythm is regular;  no systolic murmur;     GASTROINTESTINAL: nontender; normal bowel sounds; no masses;     LYMPHATIC: no enlargement of cervical or facial nodes; no supraclavicular nodes;     SKIN:  no significant rashes or lesions; no suspicious moles;     MUSCULOSKELETAL: there is moderate tenderness in the low back and just to the left of midline - it is not as significant as it was yesterday - he took a hydrocodone tablet about 30 minutes ago;     NEUROLOGIC: mental status: alert and oriented x 3; cranial nerves II-XII grossly intact;     PSYCHIATRIC: appropriate affect and demeanor; normal psychomotor function;         Lab/Test Results:         LABORATORY RESULTS: EKG performed by tls Patient was sitting for ekg/tls        Procedures:     Low back pain    1. Toradol 60 mg given IM in the right hip; administered by karen;  lot number [id number]; expires 9/22             Assessment:         M54.5   Low back pain       D72.829   Elevated white blood cell count, unspecified       I10   Essential (primary) hypertension       E78.2   Mixed hyperlipidemia       R00.0   Tachycardia, unspecified       Z12.5   Encounter for screening for malignant neoplasm of prostate           ORDERS:         Radiology/Test Orders:       [id number]  Computed tomography, lumbar spine; without contrast material  (Send-Out; Stat)            74585  Radiologic exam chest 2 views  (Send-Out; Stat)             44988  Electrocardiogram, routine with at least 12 leads; with interpretation and report  (In-House)              Lab Orders:       [id number]  BDCBC - [facility] CBC with 3 part diff  (Send-Out)            61560  SED - [facility] Sedimentation rate, non-automated ESR  (Send-Out)            10909  COMP - [facility] Comp. Metabolic Panel  (Send-Out)            10292  TSH - [facility] TSH  (Send-Out)            37748  PRSAS - [facility] PSA Screen or Medicare screening order:   (Send-Out)              Other Orders:       [id number]  Therapeutic injection  (In-House)              Toradol, per 15 mg  (x4)                  Plan:         Low back pain        RADIOLOGY:  I have ordered CT Lumbar Spine w/o contrast to be done today.      RECOMMENDATIONS given include: Adebayo has decreased pain this morning subjectively and is less tender.  Of course, he has pain medication on board.  I still do not have a comfort level with what is going on.  We will await his X-ray report.  I am leaning toward getting CT scan and repeating blood work this morning.  I'm concerned about the overall clinical picture as noted on the addendum to his testing last night..          ADDENDUM - Adebayo's urine looks good.  His X-ray does not show obviously worrisome finding.  Having said that, I still remain concerned about occult infection or other process being in play.  We will move forward with testing as noted below.  I need to be assured that there is no acute infection present that might require hospital stay.  He also has a rapid heartbeat.  We will get a limited EKG since he cannot lay flat.  Follow from there. - Benedicto Campos MD - 4/9/21 - 09:17Narcotic or pain medication Toradol 60mg IM          Orders:       [id number]  Computed tomography, lumbar spine; without contrast material  (Send-Out; Stat)            21622  Therapeutic injection  (In-House)              Toradol, per 15 mg  (x4)          Elevated white blood cell count, unspecified    LABORATORY:  Labs ordered to  be performed today include CBC and ESR.      RADIOLOGY:  I have ordered a chest x-ray (PA and lateral) to be done today.            Orders:       [id number]  [facility] CBC with 3 part diff  (Send-Out)            82035  SED - [facility] Sedimentation rate, non-automated ESR  (Send-Out)            89776  Radiologic exam chest 2 views  (Send-Out; Stat)              Essential (primary) hypertension    LABORATORY:  Labs ordered to be performed today include Comprehensive metabolic panel.            Orders:       [id number]  COMP - [facility] Comp. Metabolic Panel  (Send-Out)              Mixed hyperlipidemiaAs above.    LABORATORY:  Labs ordered to be performed today include TSH.            Orders:       [id number]  TSH - [facility] TSH  (Send-Out)              Tachycardia, unspecified        TESTS/PROCEDURES:  Will proceed with an ECG to be performed/scheduled now.            Orders:       [id number]  Electrocardiogram, routine with at least 12 leads; with interpretation and report  (In-House)              Encounter for screening for malignant neoplasm of prostate          Orders:       [id number]  [facility] or [facility] screening order:   (Send-Out)                  Charge Capture:         Primary Diagnosis:     M54.5  Low back pain           Orders:      [id number]  Office/outpatient visit; established patient, level 4  (In-House)            28842  Therapeutic injection  (In-House)              Toradol, per 15 mg  (x4)          D72.829  Elevated white blood cell count, unspecified     I10  Essential (primary) hypertension     E78.2  Mixed hyperlipidemia     R00.0  Tachycardia, unspecified           Orders:      [id number]  Electrocardiogram, routine with at least 12 leads; with interpretation and report  (In-House)              Z12.5  Encounter for screening for malignant neoplasm of prostate

## 2021-05-18 NOTE — PROGRESS NOTES
Satnam Leone  1960     Office/Outpatient Visit    Visit Date: Wed, May 20, 2020 01:39 pm    Provider: Tiff Milner N.P. (Assistant: Spurling, Sarah C, MA)    Location: [facility]        Electronically signed by Tiff Milner N.P. on  05/21/2020 04:31:25 PM                             Subjective:        CC: Mr. Leone is a 59 year old White male.  spider bite on top of right foot.          HPI:           Patient presents with bitten or stung by nonvenomous insect and other nonvenomous arthropods, initial encounter.  unknown insect bite top of right foot more than 48 hrs ago.  area itches and noting increased redness around bite.  afebrile.  denies rash.  takes daily allergy medication.            Essential (primary) hypertension details; his current cardiac medication regimen includes an ACE inhibitor ( lisinopril ).  Compliance with treatment has been good; he takes his medication as directed.  He is tolerating the medication well without side effects.  Review of his blood pressure log reveals systolics in the 140s and diastolics in the 80s.  concerned that bp is higher and would like to increase lisinopril dose.  will be having DOT physical this year.      ROS:     CONSTITUTIONAL:  Negative for chills, fatigue, fever, and weight change.      E/N/T:  Negative for hearing problems, E/N/T pain, congestion, rhinorrhea, epistaxis, hoarseness, and dental problems.      CARDIOVASCULAR:  Negative for chest pain, palpitations, tachycardia, orthopnea, and edema.      RESPIRATORY:  Negative for cough, dyspnea, and hemoptysis.      MUSCULOSKELETAL:  Negative for arthralgias, back pain, and myalgias.      INTEGUMENTARY:  Positive for insect bite top of right foot.      NEUROLOGICAL:  Negative for dizziness, headaches, paresthesias, and weakness.          Past Medical History / Family History / Social History:         Last Reviewed on 5/20/2020 01:46 PM by Tiff Milner    Past Medical History:              PREVENTIVE HEALTH MAINTENANCE             COLORECTAL CANCER SCREENING: declines colorectal cancer screening, understands reason for testing     PSA: declines.  understands reason for test         Surgical History:         Positive for    Splenectomy and    spinal fusion mid 1980s;;         Family History:         Positive for Hypertension ( father ).  Father:  at age 58;  Hypertension     Mother: Healthy         Social History:     Occupation: [employer]     Marital Status:      Children: None         Tobacco/Alcohol/Supplements:     Last Reviewed on 2020 01:39 PM by Spurling, Sarah C    Tobacco: Current Smoker: He currently smokes every day, 1/2 pack per day.          Current Problems:     Last Reviewed on 2020 01:39 PM by Spurling, Sarah C    Essential (primary) hypertension    Benign lipomatous neoplasm of other sites        Immunizations:     Pneumococcal, 23-valent IM/SC (adult and pt >=2yr) 2006        Allergies:     Last Reviewed on 2020 01:39 PM by Spurling, Sarah C    No Known Allergies.        Current Medications:     Last Reviewed on 2020 01:40 PM by Spurling, Sarah C    Lisinopril 20 mg oral tablet [1 tab daily]    Pravastatin 20 mg oral tablet [1 tab q day hs]        Objective:        Vitals:         Historical:     2019  BP:   135/77 mm Hg ( (left arm, , sitting, );) 10/5/2018  BP:   127/80 mm Hg ( (left arm, , sitting, );) 2019  Wt:   232.8lbs    Current: 2020 1:42:55 PM    Ht:  6 ft, 1 in;  Wt: 232.2 lbs;  BMI: 30.6T: 97.1 F (oral);  BP: 138/85 mm Hg (left arm, sitting);  P: 88 bpm (left arm (BP Cuff), sitting);  sCr: 0.8 mg/dL;  GFR: 111.99        Exams:     PHYSICAL EXAM:     GENERAL:  well developed and nourished; appropriately groomed; in no apparent distress;     RESPIRATORY: normal respiratory rate and pattern with no distress; normal breath sounds with no rales, rhonchi, wheezes or rubs;     CARDIOVASCULAR: normal  rate; rhythm is regular;     Peripheral Pulses: dorsalis pedis: 2+ R;  posterior tibial: 2+ R;  no edema;     SKIN: 3-4 mm abrasion top of right foot with mild surrounding erythema;     MUSCULOSKELETAL:  Normal range of motion, strength and tone;     NEUROLOGIC: mental status: alert and oriented x 3; GROSSLY INTACT     PSYCHIATRIC:  appropriate affect and demeanor; normal speech pattern; grossly normal memory;         Assessment:         W57.XXXA   Bitten or stung by nonvenomous insect and other nonvenomous arthropods, initial encounter       I10   Essential (primary) hypertension           ORDERS:         Meds Prescribed:       [New Rx] lisinopriL 30 mg oral tablet [take 1 tablet (30 mg) by oral route once daily], #90 (ninety) tablets, Refills: 1 (one)       [New Rx] Bactrim -160 mg oral tablet [take 1 tablet by oral route bid x 7 days], #14 (fourteen) tablets, Refills: 0 (zero)                 Plan:         Bitten or stung by nonvenomous insect and other nonvenomous arthropods, initial encounter        RECOMMENDATIONS given include: declines use of doxycycline due to potential for sun sensitivity.  follow up if not improving.  to ER ir worsens..            Prescriptions:       [New Rx] Bactrim -160 mg oral tablet [take 1 tablet by oral route bid x 7 days], #14 (fourteen) tablets, Refills: 0 (zero)         Essential (primary) hypertension        RECOMMENDATIONS given include: avoid pseudoephedrine or other stimulants/decongestants in common cold remedies, perform routine monitoring of blood pressure with home blood pressure cuff, reduction of dietary salt intake, and take medication as prescribed, try not to miss doses.            Prescriptions:       [New Rx] lisinopriL 30 mg oral tablet [take 1 tablet (30 mg) by oral route once daily], #90 (ninety) tablets, Refills: 1 (one)             Patient Recommendations:        For  Essential (primary) hypertension:    Certain decongestants and stimulants (like  pseudoephedrine) in common cold remedies raise blood pressure, sometimes to dangerously high levels. Never take with MAO inhibitors! Begin monitoring your blood pressure by brief nurse visits at our office, a home blood pressure monitor, or by checking on the machines in pharmacies or stores.  Keep a log of the readings. Reduce the amount of salt in your diet.              Charge Capture:         Primary Diagnosis:     W57.XXXA  Bitten or stung by nonvenomous insect and other nonvenomous arthropods, initial encounter           Orders:      [id number]  Office/outpatient visit; established patient, level 3  (In-House)              I10  Essential (primary) hypertension

## 2021-05-18 NOTE — PROGRESS NOTES
Satnam Leone 1960     Office/Outpatient Visit    Visit Date: Fri, Oct 5, 2018 11:16 am    Provider: Tiff Milner N.P. (Assistant: Sarah Spurling, MA)    Location: [facility]        Electronically signed by Tiff Milner N.P. on  10/05/2018 04:02:46 PM                             SUBJECTIVE:        CC:     Mr. Leone is a 58 year old White male.  This is a follow-up visit.  med refills.          HPI:         Patient presents with hyperlipidemia.  Current treatment includes Pravachol and a low cholesterol/low fat diet.  Compliance with treatment has been good.  He denies experiencing any hypercholesterolemia related symptoms.          Hypertension details; his current cardiac medication regimen includes an ACE inhibitor ( lisinopril ).  He did not bring his blood pressure diary, but says that pressures have been well controlled.  He is tolerating the medication well without side effects.  Compliance with treatment has been good; he takes his medication as directed.      ROS:     CONSTITUTIONAL:  Negative for chills, fatigue, fever, and weight change.      CARDIOVASCULAR:  Negative for chest pain, palpitations, tachycardia, orthopnea, and edema.      RESPIRATORY:  Negative for cough, dyspnea, and hemoptysis.      GASTROINTESTINAL:  Negative for abdominal pain, heartburn, constipation, diarrhea, and stool changes.      GENITOURINARY:  Negative for dysuria, genital lesions, hematuria, impotence, polyuria, and changes in urine stream.      MUSCULOSKELETAL:  Negative for arthralgias, back pain, and myalgias.      NEUROLOGICAL:  Negative for dizziness, headaches, paresthesias, and weakness.      ENDOCRINE:  Negative for hair loss, heat/cold intolerance, polydipsia, and polyphagia.      PSYCHIATRIC:  Negative for anxiety, depression, and sleep disturbances.          PMH/FMH/SH:     Last Reviewed on 10/05/2018 04:01 PM by Tiff Milner    Past Medical History:             PREVENTIVE HEALTH  MAINTENANCE             COLORECTAL CANCER SCREENING: declines colorectal cancer screening, understands reason for testing     PSA: declines.  understands reason for test         Surgical History:         Positive for    Splenectomy and    spinal fusion mid ;;         Family History:         Positive for Hypertension ( father ).  Father:  at age 58;  Hypertension     Mother: Healthy         Social History:     Occupation: [employer]     Marital Status:      Children: None         Tobacco/Alcohol/Supplements:     Last Reviewed on 10/05/2018 11:21 AM by Spurling, Sarah C    Tobacco: Current Smoker: He currently smokes every day, 1/2 to 1 pack per day.              Current Problems:     Last Reviewed on 2018 12:47 PM by Caitlin Lee    Hyperlipidemia     Hypertension     Atypical skin lesion     History of splenectomy         Immunizations:     Pneumococcal, 23-valent IM/SC (adult and pt >=2yr) 2006         Allergies:     Last Reviewed on 2018 12:31 PM by Rosalba Hyman      No Known Drug Allergies.         Current Medications:     Last Reviewed on 2018 12:47 PM by Caitlin Lee    Lisinopril 20mg Tablet 1 tab daily     Pravastatin 20mg Tablet 1 tab q day hs         OBJECTIVE:        Vitals:         Historical:     2018  BP:   133/78 mm Hg ( (left arm, , sitting, );)     2018  Wt:   230.4lbs        Current: 10/5/2018 11:23:52 AM    Ht:  6 ft, 1 in;  Wt: 232.4 lbs;  BMI: 30.7    T: 98.1 F (oral);  BP: 127/80 mm Hg (left arm, sitting);  P: 89 bpm (left arm (BP Cuff), sitting);  sCr: 0.75 mg/dL;  GFR: 120.91        Exams:     PHYSICAL EXAM:     GENERAL:  well developed and nourished; appropriately groomed; in no apparent distress;     RESPIRATORY: normal respiratory rate and pattern with no distress; normal breath sounds with no rales, rhonchi, wheezes or rubs;     CARDIOVASCULAR: normal rate; rhythm is regular;     MUSCULOSKELETAL:  Normal range of  motion, strength and tone;     NEUROLOGIC: mental status: alert and oriented x 3; GROSSLY INTACT     PSYCHIATRIC:  appropriate affect and demeanor; normal speech pattern; grossly normal memory;         ASSESSMENT           272.4   E78.2  Hyperlipidemia              DDx:     401.1   I10  Hypertension              DDx:         ORDERS:         Meds Prescribed:       Refill of: Pravastatin 20mg Tablet 1 tab q day hs  #90 (Ninety) tablet(s) Refills: 1       Refill of: Lisinopril 20mg Tablet 1 tab daily  #90 (Ninety) tablet(s) Refills: 1         Lab Orders:       [id number]  hospitals - [facility] CMP AND LIPID: [id number]  (Send-Out)                   PLAN:          Hyperlipidemia         RECOMMENDATIONS given include: alcohol avoidance, exercise, and low cholesterol/low fat diet.            Prescriptions:       Refill of: Pravastatin 20mg Tablet 1 tab q day hs  #90 (Ninety) tablet(s) Refills: 1          Hypertension     LABORATORY:  Labs ordered to be performed today include HTN/Lipid Panel: CMP, Lipid.      RECOMMENDATIONS given include: perform routine monitoring of blood pressure with home blood pressure cuff, have spouse or friend monitor for loud snoring/sleep apnea, reduction of dietary salt intake, and take medication as prescribed, try not to miss doses.            Prescriptions:       Refill of: Lisinopril 20mg Tablet 1 tab daily  #90 (Ninety) tablet(s) Refills: 1           Orders:       [id number]  [facility] CMP AND LIPID: [id number]  (Send-Out)               Patient Recommendations:        For  Hyperlipidemia:     Avoid alcohol as it can contribute to elevated blood pressure. Maintain a regular exercise program. Reduce the amount of cholesterol and saturated fat in your diet.          For  Hypertension:     Begin monitoring your blood pressure by brief nurse visits at our office, a home blood pressure monitor, or by checking on the machines in pharmacies or stores.  Keep a log of the readings. Obstructive sleep apnea is  much more prevalent than historically reported and is a greatly under recognized cause of hypertension. If you have loud snoring, if you wake up tired and feel tired thru out the day, you may have sleep apnea.  One way to suspect this is if a loved one reports that you snore very loudly or if you seem to quit breathing for a time while you are asleep.  If this describes you, you should consult your doctor about have a sleep study performed. Reduce the amount of salt in your diet.              CHARGE CAPTURE           **Please note: ICD descriptions below are intended for billing purposes only and may not represent clinical diagnoses**        Primary Diagnosis:         272.4 Hyperlipidemia            E78.2    Mixed hyperlipidemia              Orders:          [id number]   Office/outpatient visit; established patient, level 3  (In-House)           401.1 Hypertension            I10    Essential (primary) hypertension

## 2021-05-18 NOTE — PROGRESS NOTES
Satnam Leone  1960     Office/Outpatient Visit    Visit Date:  10:01 am    Provider: Joanna Contreras N.P. (Assistant: Ivette Hyde, )    Location: [facility]        Electronically signed by Joanna Contreras N.P. on  2021 03:30:51 PM                             Subjective:        CC: Mr. Leone is a 60 year old White male.  headache, SOA, tested neg x2 for COVID, out of quarantine today         HPI:           Mr. Leone presents with acute upper respiratory infection, unspecified.  These have been present for the past one week.  The symptoms include dry cough and headache.  He reports recent exposure to illness from co-workers.      ROS:     CONSTITUTIONAL:  Negative for chills, fatigue, fever, and weight change.      CARDIOVASCULAR:  Positive for HTN, went on 3rd shift since 2020 and BP has been elevated since then.      RESPIRATORY:  Positive for recent cough and dyspnea.   Negative for frequent wheezing.      NEUROLOGICAL:  Positive for headaches.      PSYCHIATRIC:  Negative for anxiety, depression, and sleep disturbances.          Past Medical History / Family History / Social History:         Last Reviewed on 2021 10:35 AM by Joanna Contreras    Past Medical History:             PREVENTIVE HEALTH MAINTENANCE             COLORECTAL CANCER SCREENING: declines colorectal cancer screening, understands reason for testing     PSA: declines.  understands reason for test         Surgical History:         Positive for    Splenectomy and    spinal fusion mid ;;         Family History:         Positive for Hypertension ( father ).  Father:  at age 58;  Hypertension     Mother: Healthy         Social History:     Occupation: [employer]     Marital Status:      Children: None         Tobacco/Alcohol/Supplements:     Last Reviewed on 2021 10:35 AM by Joanna Contreras    Tobacco: Current Smoker: He currently smokes every day, 1/2 pack  per day.          Mental Health History:     Last Reviewed on 2/12/2021 10:35 AM by Joanna Contreras        Current Problems:     Last Reviewed on 2/12/2021 10:35 AM by Joanna Contreras    Essential (primary) hypertension    Benign lipomatous neoplasm of other sites    Mixed hyperlipidemia    Cough        Immunizations:     influenza, injectable, quadrivalent, preservative free (FLUZONE QUAD [id number]) 10/3/2020    Pneumococcal, 23-valent IM/SC (adult and pt >=2yr) 11/20/2006        Allergies:     Last Reviewed on 2/12/2021 10:35 AM by Joanna Contreras    No Known Allergies.        Current Medications:     Last Reviewed on 2/12/2021 10:35 AM by Joanna Contreras    pravastatin 20 mg oral tablet [TAKE 1 TABLET BY MOUTH EVERY NIGHT AT BEDTIME]    lisinopriL 30 mg oral tablet [take 1 tablet (30 mg) by oral route once daily]        Objective:        Vitals:         Current: 2/12/2021 10:07:45 AM    Ht:  6 ft, 1 in;  Wt: 244.5 lbs;  BMI: 32.3T: 97 F (temporal);  BP: 133/93 mm Hg (left arm, sitting);  P: 108 bpm (left arm (BP Cuff), sitting);  sCr: 0.78 mg/dL;  GFR: 116.02O2 Sat: 95 % (room air)        Exams:     PHYSICAL EXAM:     GENERAL: Vitals recorded well developed, well nourished;  well groomed;  no apparent distress;     RESPIRATORY: normal respiratory rate and pattern with no distress; decreased breath sounds in the bases;     CARDIOVASCULAR: normal rate; rhythm is regular;  no systolic murmur; no edema;     GASTROINTESTINAL: nontender, nondistended; no hepatosplenomegaly or masses; no bruits;     NEUROLOGIC: GROSSLY INTACT     PSYCHIATRIC:  appropriate affect and demeanor; normal speech pattern; grossly normal memory;         Assessment:         I10   Essential (primary) hypertension       J06.9   Acute upper respiratory infection, unspecified           ORDERS:         Meds Prescribed:       [New Rx] lisinopriL 20 mg oral tablet [take 1 tablet (20 mg) by oral route twice daily], #180 (one hundred and  eighty) tablets, Refills: 0 (zero)       [New Rx] doxycycline monohydrate 100 mg oral capsule [take 1 capsule (100 mg) by oral route 2 times per day], #20 (twenty) capsules, Refills: 0 (zero)       [New Rx] guaiFENesin 200 mg oral tablet [take 2 tablets by mouth bid x 2 weeks], #28 (twenty eight) tablets, Refills: 0 (zero)       [New Rx] Ventolin HFA 90 mcg/actuation Inhalation HFA Aerosol Inhaler [1 puff inhaled every 4 hours  SOB/wheezing or insurance covered], #1 (one) each, Refills: 1 (one)         Lab Orders:       APPTO  Appointment need  (In-House)                      Plan:         Essential (primary) hypertension        FOLLOW-UP: Schedule a follow-up visit in 3 months.:.:for Annual Checkup           Prescriptions:       [New Rx] lisinopriL 20 mg oral tablet [take 1 tablet (20 mg) by oral route twice daily], #180 (one hundred and eighty) tablets, Refills: 0 (zero)           Orders:       APPTO  Appointment need  (In-House)              Acute upper respiratory infection, unspecifiedNeg for Covid 2/5/2021, and 2/10/2021 [place] at [facility] [place]. Off work until 2/15/2021        FOLLOW-UP: Advised to call if there is no improvement Follow-up by phone if no improvement in 1 week..  :Schedule follow-up appointments on a p.r.n. basis.:.            Prescriptions:       [New Rx] doxycycline monohydrate 100 mg oral capsule [take 1 capsule (100 mg) by oral route 2 times per day], #20 (twenty) capsules, Refills: 0 (zero)       [New Rx] guaiFENesin 200 mg oral tablet [take 2 tablets by mouth bid x 2 weeks], #28 (twenty eight) tablets, Refills: 0 (zero)       [New Rx] Ventolin HFA 90 mcg/actuation Inhalation HFA Aerosol Inhaler [1 puff inhaled every 4 hours  SOB/wheezing or insurance covered], #1 (one) each, Refills: 1 (one)             Patient Recommendations:        For  Essential (primary) hypertension:    Schedule a follow-up visit in 3 months.                APPOINTMENT INFORMATION:        Monday Tuesday Wednesday Thursday   Friday Saturday Sunday            Time:___________________AM  PM   Date:_____________________         For  Acute upper respiratory infection, unspecified:    Follow-up by phone if no improvement in 1 week. Schedule follow-up appointments as needed.                APPOINTMENT INFORMATION:        Monday Tuesday Wednesday Thursday Friday Saturday Sunday            Time:___________________AM  PM   Date:_____________________             Charge Capture:         Primary Diagnosis:     I10  Essential (primary) hypertension           Orders:      [id number]  Office/outpatient visit; established patient, level 3  (In-House)            APPTO  Appointment need  (In-House)              J06.9  Acute upper respiratory infection, unspecified

## 2021-05-18 NOTE — PROGRESS NOTES
Satnam Leone 1960     Office/Outpatient Visit    Visit Date: Fri, Feb 16, 2018 08:49 am    Provider: Tiff Milner N.P. (Assistant: Gay Huang MA)    Location: [facility]        Electronically signed by Tiff Milner N.P. on  02/17/2018 09:12:36 PM                             SUBJECTIVE:        CC:     Mr. Leone is a 57 year old White male.  six month check up         HPI:         Patient to be evaluated for hyperlipidemia.  Current treatment includes Pravachol and a low cholesterol/low fat diet.  Compliance with treatment has been good; he takes his medication as directed and follows up as directed.  He denies experiencing any hypercholesterolemia related symptoms.          Dx with hypertension; his current cardiac medication regimen includes an ACE inhibitor ( lisinopril ).  He did not bring his blood pressure diary, but says that pressures have been well controlled.  He is tolerating the medication well without side effects.  Compliance with treatment has been good; he takes his medication as directed and follows up as directed.          132-133 stable last 2 yrs.  not aware of low sodium level at any previous time.      ROS:     CONSTITUTIONAL:  Negative for chills, fatigue, fever, and weight change.      CARDIOVASCULAR:  Negative for chest pain, palpitations, tachycardia, orthopnea, and edema.      RESPIRATORY:  Negative for cough, dyspnea, and hemoptysis.      GASTROINTESTINAL:  Negative for abdominal pain, heartburn, constipation, diarrhea, and stool changes.      GENITOURINARY:  Negative for dysuria, genital lesions, hematuria, impotence, polyuria, and changes in urine stream.      MUSCULOSKELETAL:  Negative for arthralgias, back pain, and myalgias.      INTEGUMENTARY:  Positive for lipomas on back evaluated by dr winslow over 10 yrs ago.  pt states size is stable..      NEUROLOGICAL:  Negative for dizziness, headaches, paresthesias, and weakness.      ENDOCRINE:  Negative  for hair loss, heat/cold intolerance, polydipsia, and polyphagia.      PSYCHIATRIC:  Negative for anxiety, depression, and sleep disturbances.          PMH/FMH/SH:     Last Reviewed on 2017 10:02 AM by Tiff Milner    Surgical History:         Positive for    Splenectomy and    spinal fusion mid ;;         Family History:         Positive for Hypertension ( father ).  Father:  at age 58;  Hypertension     Mother: Healthy         Social History:     Occupation: [employer]     Marital Status:      Children: None         Tobacco/Alcohol/Supplements:     Last Reviewed on 2017 09:42 AM by Erin Kingsley    Tobacco: Current Smoker: He currently smokes every day, 1/2 to 1 pack per day.              Current Problems:     Hyperlipidemia     Hypertension     Atypical skin lesion     History of splenectomy         Immunizations:     Pneumococcal, 23-valent IM/SC (adult and pt >=2yr) 2006         Allergies:     Last Reviewed on 2017 09:41 AM by Erin Kingsley      No Known Drug Allergies.         Current Medications:     Last Reviewed on 2017 09:42 AM by Erin Kingsley    Lisinopril 20mg Tablet 1 tab daily     Pravastatin 20mg Tablet 1 tab q day hs         OBJECTIVE:        Vitals:         Historical:     2017  BP:   115/76 mm Hg ( (left arm, , sitting, );)     2017  Wt:   231.1lbs        Current: 2018 8:54:24 AM    Ht:  6 ft, 4 in;  Wt: 235 lbs;  BMI: 28.6    T: 97.1 F (oral);  BP: 127/83 mm Hg (left arm, sitting);  P: 87 bpm (left arm (BP Cuff), sitting);  sCr: 0.85 mg/dL;  GFR: 111.66        Exams:     PHYSICAL EXAM:     GENERAL:  well developed and nourished; appropriately groomed; in no apparent distress;     NECK: thyroid is non-palpable;     RESPIRATORY: normal respiratory rate and pattern with no distress; normal breath sounds with no rales, rhonchi, wheezes or rubs;     CARDIOVASCULAR: normal rate; rhythm is regular;      LYMPHATIC: no enlargement of cervical or facial nodes;     SKIN: lipoma ( upper back 2 in. x 4 in and 2 other smaller lipomas 1/2 in each diameter. );     MUSCULOSKELETAL:  Normal range of motion, strength and tone;     NEUROLOGIC: mental status: alert and oriented x 3; GROSSLY INTACT     PSYCHIATRIC:  appropriate affect and demeanor; normal speech pattern; grossly normal memory;         ASSESSMENT           272.4   E78.2  Hyperlipidemia              DDx:     401.1   I10  Hypertension              DDx:     276.1   E87.1  Low sodium level              DDx:         ORDERS:         Meds Prescribed:       Refill of: Pravastatin 20mg Tablet 1 tab q day hs  #90 (Ninety) tablet(s) Refills: 1       Refill of: Lisinopril 20mg Tablet 1 tab daily  #90 (Ninety) tablet(s) Refills: 1         Lab Orders:       [id number]  HTN - [facility] CMP AND LIPID: [id number]  (Send-Out)         [id number]  TSH - [facility] TSH  (Send-Out)         [id number]  BDUAM - [facility] Urinalysis, automated, with micro  (Send-Out)                   PLAN:          Hyperlipidemia         RECOMMENDATIONS given include: exercise and low cholesterol/low fat diet.      declines colonsocopy or PSA.            Prescriptions:       Refill of: Pravastatin 20mg Tablet 1 tab q day hs  #90 (Ninety) tablet(s) Refills: 1          Hypertension     LABORATORY:  Labs ordered to be performed today include HTN/Lipid Panel: CMP, Lipid.      RECOMMENDATIONS given include: avoid pseudoephedrine or other stimulants/decongestants in common cold remedies, perform routine monitoring of blood pressure with home blood pressure cuff, have spouse or friend monitor for loud snoring/sleep apnea, exercise, reduction of dietary salt intake, take medication as prescribed, try not to miss doses, and Advised about free BP checks on the last Saturday of each month.            Prescriptions:       Refill of: Lisinopril 20mg Tablet 1 tab daily  #90 (Ninety) tablet(s) Refills: 1           Orders:       [id number]  [facility]  CMP AND LIPID: [id number]  (Send-Out)            Low sodium level     LABORATORY:  Labs ordered to be performed today include TSH and urinalysis with micro.            Orders:       [id number]  TSH - [facility] TSH  (Send-Out)         [id number]  BDUAM - [facility] Urinalysis, automated, with micro  (Send-Out)               Patient Recommendations:        For  Hyperlipidemia:     Maintain a regular exercise program. Reduce the amount of cholesterol and saturated fat in your diet.          For  Hypertension:     Certain decongestants and stimulants (like pseudoephedrine) in common cold remedies raise blood pressure, sometimes to dangerously high levels. Never take with MAO inhibitors! Begin monitoring your blood pressure by brief nurse visits at our office, a home blood pressure monitor, or by checking on the machines in pharmacies or stores.  Keep a log of the readings. Obstructive sleep apnea is much more prevalent than historically reported and is a greatly under recognized cause of hypertension. If you have loud snoring, if you wake up tired and feel tired thru out the day, you may have sleep apnea.  One way to suspect this is if a loved one reports that you snore very loudly or if you seem to quit breathing for a time while you are asleep.  If this describes you, you should consult your doctor about have a sleep study performed. Maintain a regular exercise program. Reduce the amount of salt in your diet.              CHARGE CAPTURE           **Please note: ICD descriptions below are intended for billing purposes only and may not represent clinical diagnoses**        Primary Diagnosis:         272.4 Hyperlipidemia            E78.2    Mixed hyperlipidemia              Orders:          [id number]   Office/outpatient visit; established patient, level 3  (In-House)           401.1 Hypertension            I10    Essential (primary) hypertension    276.1 Low sodium level            E87.1    Hypo-osmolality and hyponatremia

## 2021-08-10 PROBLEM — S42.202A CLOSED FRACTURE OF LEFT PROXIMAL HUMERUS: Status: ACTIVE | Noted: 2021-01-01

## 2021-08-10 NOTE — PROGRESS NOTES
"Chief Complaint  Injury and Establish Care of the Left Shoulder    Subjective    History of Present Illness      Satnam Leone is a 60 y.o. male who presents to [facility] for complaint of Shoulder Pain: Patient complaints of left shoulder pain. This is evaluated as a personal injury. He states he fell Sunday evening, 2 days ago, and fell directly onto the left shoulder.  He is currently undergoing cancer treatment with infusions.  Unsure exact cancer diagnosis although they can tell me it is in his spine and liver.  The pain is described as aching.  The onset of the pain was sudden, related to a fall from standing. Mechanism of injury: fall. Symptoms are aggravated by all activities. Symptoms are diminished by rest, medication: currently on morphine and oxycodone.         Objective   Vital Signs:   Temp 97.2 °F (36.2 °C)   Ht 190.5 cm (75\")   Wt 104 kg (229 lb)   BMI 28.62 kg/m²     Physical Exam  Vitals signs and nursing note reviewed.   Constitutional:       Appearance: Normal appearance.   Pulmonary:      Effort: Pulmonary effort is normal.   Skin:     General: Skin is warm and dry.      Capillary Refill: Capillary refill takes less than 2 seconds.   Neurological:      General: No focal deficit present.      Mental Status: He is alert and oriented to person, place, and time. Mental status is at baseline.   Psychiatric:         Mood and Affect: Mood normal.         Behavior: Behavior normal.         Thought Content: Thought content normal.         Judgment: Judgment normal.     Ortho Exam   LEFT shoulder  The patient has an obvious fracture of the proximal humerus.   Range of motion of the shoulder is significantly compromised.   Axillary nerve function is well preserved. No evidence of compartmental syndrome.   Patient is moving the fingers slowly.   Appropriate amounts of swelling and bruising are noted.   Radial artery pulses are palpable.   The pain score is 8/10. "   Capillary refill is less than 2 seconds.   Sensation and motor function is intact.        Result Review :   Radiologic studies - see below for interpretation  Left shoulder xrays 3views were performed at [facility] on 8/9/21. These images were independently viewed and interpreted by myself, my impression as follows:   Nondisplaced fracture of the proximal humerus, advanced glenohumeral arthritis            Assessment   Assessment and Plan    Problem List Items Addressed This Visit        Musculoskeletal and Injuries    Closed fracture of left proximal humerus - Primary    Relevant Orders    Shoulder & Arm Sling    Splint          Follow Up   · Discussion of any imaging in detail. Discussion of orthopaedic goals.  · Risk, benefits, and merits of treatment alternatives reviewed with the patient. Treatment alternatives include: bracing and eventual surgery with rev tsa. At this point he is undergoing treatment for cancer and is very frail. We will proceed with a humeral cuff and see him back in 3 weeks.  · Ice, heat, and/or modalities as beneficial  · Patient is encouraged to call or return for any issues or concerns.  · Shoulder Soriano fracture brace-sent to [facility] with rx for this and simple sling due to no stock of brace needed. Discussed continuing on the aspirin he is on for prevention of blood clots. Discussed doing pendulum exercises 3-4 times per day to keep shoulder from freezing.  · Follow up in 3 weeks  • Patient was given instructions and counseling regarding his condition or for health maintenance advice. Please see specific information pulled into the AVS if appropriate.     Stephen Carrasco PA-C   Date of Encounter: 8/10/2021   Electronically signed by Stephen Carrasco PA-C, 08/10/21, 8:52 AM EDT.     EMR Dragon/Transcription disclaimer:  Much of this encounter note is an electronic transcription/translation of spoken language to printed text. The electronic translation of  spoken language may permit erroneous, or at times, nonsensical words or phrases to be inadvertently transcribed; Although I have reviewed the note for such errors, some may still exist.

## 2021-08-12 NOTE — TELEPHONE ENCOUNTER
PATIENT'S WIFE CALLED AND STATED THAT PATIENT WAS ADMITTED TO [facility] YESTERDAY AND WILL MOST LIKELY BE THERE A FEW DAYS.  PATIENT WAS SUPPOSED TO COME IN OFFICE TODAY TO BE FITTED FOR BRACE FOR HIS PROXIMAL HUMERUS FRACTURE.  SHE IS WANTING TO KNOW WHAT PATIENT'S OPTIONS ARE IN THAT REGARD.  SHE ASKED IF SOMEONE AT [facility] COULD FIT HIM FOR THE BRACE    PLEASE ADVISE

## 2021-08-13 NOTE — TELEPHONE ENCOUNTER
Yes I have talked with Kassy regarding this and she was going to have them put a brace on him today.